# Patient Record
Sex: MALE | Employment: FULL TIME | URBAN - METROPOLITAN AREA
[De-identification: names, ages, dates, MRNs, and addresses within clinical notes are randomized per-mention and may not be internally consistent; named-entity substitution may affect disease eponyms.]

---

## 2018-03-06 ENCOUNTER — OFFICE VISIT (OUTPATIENT)
Dept: URGENT CARE | Facility: CLINIC | Age: 38
End: 2018-03-06
Payer: COMMERCIAL

## 2018-03-06 VITALS
DIASTOLIC BLOOD PRESSURE: 85 MMHG | HEART RATE: 104 BPM | OXYGEN SATURATION: 99 % | RESPIRATION RATE: 18 BRPM | TEMPERATURE: 100 F | HEIGHT: 68 IN | WEIGHT: 195 LBS | SYSTOLIC BLOOD PRESSURE: 134 MMHG | BODY MASS INDEX: 29.55 KG/M2

## 2018-03-06 DIAGNOSIS — R50.9 FEVER, UNSPECIFIED FEVER CAUSE: ICD-10-CM

## 2018-03-06 DIAGNOSIS — J10.1 INFLUENZA B: Primary | ICD-10-CM

## 2018-03-06 LAB
CTP QC/QA: YES
FLUAV AG NPH QL: NEGATIVE
FLUBV AG NPH QL: POSITIVE

## 2018-03-06 PROCEDURE — 87804 INFLUENZA ASSAY W/OPTIC: CPT | Mod: QW,S$GLB,, | Performed by: EMERGENCY MEDICINE

## 2018-03-06 PROCEDURE — 99203 OFFICE O/P NEW LOW 30 MIN: CPT | Mod: S$GLB,,, | Performed by: EMERGENCY MEDICINE

## 2018-03-06 RX ORDER — IBUPROFEN 200 MG
600 TABLET ORAL
Status: COMPLETED | OUTPATIENT
Start: 2018-03-06 | End: 2018-03-06

## 2018-03-06 RX ORDER — PROMETHAZINE HYDROCHLORIDE AND CODEINE PHOSPHATE 6.25; 1 MG/5ML; MG/5ML
SOLUTION ORAL
Qty: 118 ML | Refills: 0 | Status: SHIPPED | OUTPATIENT
Start: 2018-03-06 | End: 2020-09-04

## 2018-03-06 RX ADMIN — Medication 600 MG: at 10:03

## 2018-03-06 NOTE — LETTER
March 6, 2018      [facility] [address] [place]  [facility] [address]  Phone: [phone]  Fax: [phone]       Patient: Ryan Stephens   YOB: 1980  Date of Visit: 03/06/2018    To Whom It May Concern:    Wendy Stephens  was at [facility] on 03/06/2018. He may return to work/school on 3/8/18 with no restrictions. If you have any questions or concerns, or if I can be of further assistance, please do not hesitate to contact me.    Sincerely,      Rolando Carrillo MD

## 2018-03-06 NOTE — PROGRESS NOTES
Subjective:       Patient ID: Ryan Stephens Jr. is a 38 y.o. male.    Vitals:    03/06/18 0948   BP: 134/85   Pulse: 104   Resp: 18   Temp: 100.1 °F (37.8 °C)       Chief Complaint: Fever (3 days )    PATIENT STATES HE HAS PRETTY MILD SYMPTOMS OF COUGH. HE REPORTS HE STILL HAS APPETITE AND HAS BEEN EATING, HAS ENERGY, AND THE REASON HE IS HERE IS BECAUSE HIS FEVER KEEPS COMING BACK UP   SINCE Friday NIGHT WELL OVER 48 HOURS, ACTUALLY WELL OVER 72 HOURS AFTER SYMPTOMS ONSET TO NOW. HE DOES REPORT HEADACHE, AND MILD BODTY ACHES, WHEN THE FEVER IS PRESENT, AND IT VÁSQUEZ IMPROVE WITH THE ADVIL. HAS A 1 YO AT HOME AND MOTHER HELPING WITH .      Fever    This is a new problem. The current episode started in the past 7 days. The problem occurs constantly. The problem has been gradually worsening. The maximum temperature noted was 100 to 100.9 F. The temperature was taken using an oral thermometer. Associated symptoms include congestion, coughing, headaches and muscle aches. Pertinent negatives include no abdominal pain, chest pain, ear pain, nausea, sore throat or wheezing. He has tried acetaminophen for the symptoms. The treatment provided mild relief.     Review of Systems   Constitution: Positive for chills and fever. Negative for malaise/fatigue.   HENT: Positive for congestion. Negative for ear pain, hoarse voice and sore throat.    Eyes: Negative for discharge and redness.   Cardiovascular: Negative for chest pain, dyspnea on exertion and leg swelling.   Respiratory: Positive for cough. Negative for shortness of breath, sputum production and wheezing.    Musculoskeletal: Negative for myalgias.   Gastrointestinal: Negative for abdominal pain and nausea.   Neurological: Positive for headaches.       Objective:      Physical Exam   Constitutional: He is oriented to person, place, and time. He appears well-developed and well-nourished.   HENT:   Head: Normocephalic and atraumatic.   Right Ear:  External ear normal.   Left Ear: External ear normal.   Mouth/Throat: Mucous membranes are normal.   NASAL CONGESTION   Eyes: Conjunctivae, EOM and lids are normal.   Neck: Trachea normal, normal range of motion and full passive range of motion without pain. Neck supple.   Cardiovascular: Regular rhythm and normal heart sounds.    FEBRILE TACHY-MILD   Pulmonary/Chest: Effort normal and breath sounds normal. No respiratory distress.   DRY COUGH   Abdominal: Soft. Normal appearance and bowel sounds are normal. He exhibits no abdominal bruit, no pulsatile midline mass and no mass.   Musculoskeletal: Normal range of motion. He exhibits no edema.   Neurological: He is alert and oriented to person, place, and time. He has normal strength.   Skin: Skin is warm, dry and intact. He is not diaphoretic. No pallor.   Psychiatric: He has a normal mood and affect. His speech is normal and behavior is normal. Cognition and memory are normal.   Nursing note and vitals reviewed.      Office Visit on 03/06/2018   Component Date Value Ref Range Status    Rapid Influenza A Ag 03/06/2018 Negative  Negative Final    Rapid Influenza B Ag 03/06/2018 Positive* Negative Final     Acceptable 03/06/2018 Yes   Final       Assessment:       1. Influenza B    2. Fever, unspecified fever cause        Plan:       Ryan was seen today for fever.    Diagnoses and all orders for this visit:    Influenza B    Fever, unspecified fever cause  -     POCT Influenza A/B    Other orders  -     ibuprofen tablet 600 mg; Take 3 tablets (600 mg total) by mouth one time.          Patient Instructions   FLU B POSITIVE  REST AND HYDRATE WITH PLENTY OF FLUIDS  TYLENOL 650 MG EVERY 4-6 HOURS FOR FEVER/ACHES  MOTRIN 600 MG EVERY 6-8 HOURS FOR FEVER/ACHES  TAMIFLU RX  CHERATUSSIN AC RX FOR SEVERE COUGH OR COUGH AT NIGHT  OVER THE COUNTER ZYRTEC D OR CLARITIN D OR ALLEGRA D FOR CONGESTION AND RUNNY NOSE  OVER THE COUNTER FLONASE NASAL SPRAY FOR SINUS  CONGESTION  OVER THE COUNTER MUCINEX DM TWICE DAILY FOR COUGH    RETURN FOR ANY CONCERNS OR PROBLEMS  SEE FLU SHEET      Influenza (Adult)    Influenza is also called the flu. It is a viral illness that affects the air passages of your lungs. It is different from the common cold. The flu can easily be passed from one to person to another. It may be spread through the air by coughing and sneezing. Or it can be spread by touching the sick person and then touching your own eyes, nose, or mouth.  The flu starts 1 to 3 days after you are exposed to the flu virus. It may last for 1 to 2 weeks but many people feel tired or fatigued for many weeks afterward. You usually dont need to take antibiotics unless you have a complication. This might be an ear or sinus infection or pneumonia.  Symptoms of the flu may be mild or severe. They can include extreme tiredness (wanting to stay in bed all day), chills, fevers, muscle aches, soreness with eye movement, headache, and a dry, hacking cough.  Home care  Follow these guidelines when caring for yourself at home:  · Avoid being around cigarette smoke, whether yours or other peoples.  · Acetaminophen or ibuprofen will help ease your fever, muscle aches, and headache. Dont give aspirin to anyone younger than 18 who has the flu. Aspirin can harm the liver.  · Nausea and loss of appetite are common with the flu. Eat light meals. Drink 6 to 8 glasses of liquids every day. Good choices are water, sport drinks, soft drinks without caffeine, juices, tea, and soup. Extra fluids will also help loosen secretions in your nose and lungs.  · Over-the-counter cold medicines will not make the flu go away faster. But the medicines may help with coughing, sore throat, and congestion in your nose and sinuses. Dont use a decongestant if you have high blood pressure.  · Stay home until your fever has been gone for at least 24 hours without using medicine to reduce fever.  Follow-up care  Follow up  with your healthcare provider, or as advised, if you are not getting better over the next week.  If you are age 65 or older, talk with your provider about getting a pneumococcal vaccine every 5 years. You should also get this vaccine if you have chronic asthma or COPD. All adults should get a flu vaccine every fall. Ask your provider about this.  When to seek medical advice  Call your healthcare provider right away if any of these occur:  · Cough with lots of colored mucus (sputum) or blood in your mucus  · Chest pain, shortness of breath, wheezing, or trouble breathing  · Severe headache, or face, neck, or ear pain  · New rash with fever  · Fever of 100.4°F (38°C) or higher, or as directed by your healthcare provider  · Confusion, behavior change, or seizure  · Severe weakness or dizziness  · You get a new fever or cough after getting better for a few days  Date Last Reviewed: 1/1/2017  © 1160-0620 [employer]. [address] [place] [address], [place] [address]. All rights reserved. This information is not intended as a substitute for professional medical care. Always follow your healthcare professional's instructions.

## 2018-03-06 NOTE — PATIENT INSTRUCTIONS
FLU B POSITIVE  REST AND HYDRATE WITH PLENTY OF FLUIDS  TYLENOL 650 MG EVERY 4-6 HOURS FOR FEVER/ACHES  MOTRIN 600 MG EVERY 6-8 HOURS FOR FEVER/ACHES  NO TAMIFLU AS DISCUSSED. OVER 48, ACTUALLY OVER 72-96 HOURS AFTER SYMPTOM ONSET AND MILD SYMPTOMS  PROMETHAZINE WITH CODEINE RX FOR SEVERE COUGH OR COUGH AT NIGHT  OVER THE COUNTER ZYRTEC D OR CLARITIN D OR ALLEGRA D FOR CONGESTION AND RUNNY NOSE  OVER THE COUNTER FLONASE NASAL SPRAY FOR SINUS CONGESTION  OVER THE COUNTER MUCINEX DM TWICE DAILY FOR COUGH    RETURN FOR ANY CONCERNS OR PROBLEMS  SEE FLU SHEET      Influenza (Adult)    Influenza is also called the flu. It is a viral illness that affects the air passages of your lungs. It is different from the common cold. The flu can easily be passed from one to person to another. It may be spread through the air by coughing and sneezing. Or it can be spread by touching the sick person and then touching your own eyes, nose, or mouth.  The flu starts 1 to 3 days after you are exposed to the flu virus. It may last for 1 to 2 weeks but many people feel tired or fatigued for many weeks afterward. You usually dont need to take antibiotics unless you have a complication. This might be an ear or sinus infection or pneumonia.  Symptoms of the flu may be mild or severe. They can include extreme tiredness (wanting to stay in bed all day), chills, fevers, muscle aches, soreness with eye movement, headache, and a dry, hacking cough.  Home care  Follow these guidelines when caring for yourself at home:  · Avoid being around cigarette smoke, whether yours or other peoples.  · Acetaminophen or ibuprofen will help ease your fever, muscle aches, and headache. Dont give aspirin to anyone younger than 18 who has the flu. Aspirin can harm the liver.  · Nausea and loss of appetite are common with the flu. Eat light meals. Drink 6 to 8 glasses of liquids every day. Good choices are water, sport drinks, soft drinks without caffeine, juices,  tea, and soup. Extra fluids will also help loosen secretions in your nose and lungs.  · Over-the-counter cold medicines will not make the flu go away faster. But the medicines may help with coughing, sore throat, and congestion in your nose and sinuses. Dont use a decongestant if you have high blood pressure.  · Stay home until your fever has been gone for at least 24 hours without using medicine to reduce fever.  Follow-up care  Follow up with your healthcare provider, or as advised, if you are not getting better over the next week.  If you are age 65 or older, talk with your provider about getting a pneumococcal vaccine every 5 years. You should also get this vaccine if you have chronic asthma or COPD. All adults should get a flu vaccine every fall. Ask your provider about this.  When to seek medical advice  Call your healthcare provider right away if any of these occur:  · Cough with lots of colored mucus (sputum) or blood in your mucus  · Chest pain, shortness of breath, wheezing, or trouble breathing  · Severe headache, or face, neck, or ear pain  · New rash with fever  · Fever of 100.4°F (38°C) or higher, or as directed by your healthcare provider  · Confusion, behavior change, or seizure  · Severe weakness or dizziness  · You get a new fever or cough after getting better for a few days  Date Last Reviewed: 1/1/2017  © 7631-5708 [employer]. [address] [place] [address], [place] [address]. All rights reserved. This information is not intended as a substitute for professional medical care. Always follow your healthcare professional's instructions.

## 2019-12-30 ENCOUNTER — TELEPHONE (OUTPATIENT)
Dept: INTERNAL MEDICINE | Facility: CLINIC | Age: 39
End: 2019-12-30

## 2020-04-01 ENCOUNTER — OFFICE VISIT (OUTPATIENT)
Dept: FAMILY MEDICINE | Facility: CLINIC | Age: 40
End: 2020-04-01
Payer: COMMERCIAL

## 2020-04-01 DIAGNOSIS — K52.9 CHRONIC DIARRHEA: Primary | ICD-10-CM

## 2020-04-01 DIAGNOSIS — E55.9 VITAMIN D DEFICIENCY: ICD-10-CM

## 2020-04-01 DIAGNOSIS — Z00.00 ANNUAL PHYSICAL EXAM: ICD-10-CM

## 2020-04-01 DIAGNOSIS — R42 VERTIGO: ICD-10-CM

## 2020-04-01 PROCEDURE — 99204 PR OFFICE/OUTPT VISIT, NEW, LEVL IV, 45-59 MIN: ICD-10-PCS | Mod: 95,,, | Performed by: FAMILY MEDICINE

## 2020-04-01 PROCEDURE — 99204 OFFICE O/P NEW MOD 45 MIN: CPT | Mod: 95,,, | Performed by: FAMILY MEDICINE

## 2020-04-01 RX ORDER — FLUTICASONE PROPIONATE 50 MCG
1 SPRAY, SUSPENSION (ML) NASAL DAILY
Qty: 16 G | Refills: 5 | Status: SHIPPED | OUTPATIENT
Start: 2020-04-01 | End: 2020-09-04

## 2020-04-01 RX ORDER — MECLIZINE HYDROCHLORIDE 25 MG/1
25 TABLET ORAL 3 TIMES DAILY PRN
Qty: 30 TABLET | Refills: 5 | Status: SHIPPED | OUTPATIENT
Start: 2020-04-01 | End: 2020-09-04

## 2020-04-01 NOTE — PROGRESS NOTES
Subjective:       Patient ID: Ryan Stephens Jr. is a 40 y.o. male.    Chief Complaint: No chief complaint on file.    The patient location is: [place]  The chief complaint leading to consultation is: dizziness, stomach issues, and I need a PCP  Visit type: Virtual visit with synchronous audio and video  Total time spent with patient: 30 MINUTES  Each patient to whom he or she provides medical services by telemedicine is:  (1) informed of the relationship between the physician and patient and the respective role of any other health care provider with respect to management of the patient; and (2) notified that he or she may decline to receive medical services by telemedicine and may withdraw from such care at any time.    Notes:       40 year old male presents with stomach issues and dizziness. He has gas and stomach pains. He has more issues with this when he eats late at night. He has been treating this with green smoothies an probiotics. He has diarrhea with this as well. He ate [employer] chicken and ribs. He ate corn grits and cabbage. He avoids dairy to minimize exposure.     Dizziness:   Chronicity:  Recurrent (dizziness with change in posisitons or with turning from side to side. )  Onset:  1 to 4 weeks ago (3 weeks)  Dizziness characteristics:  Sensation of movement   Associated symptoms: ear congestion.no hearing loss, no ear pain, no fever, no headaches, no tinnitus, no vomiting, no weakness, no palpitations and no chest pain.    Review of Systems   Constitutional: Negative for activity change, fever and unexpected weight change.   HENT: Negative for congestion, ear pain, hearing loss, postnasal drip, rhinorrhea, sinus pressure, tinnitus and trouble swallowing.    Eyes: Negative for discharge and visual disturbance.   Respiratory: Negative for chest tightness and wheezing.    Cardiovascular: Negative for chest pain and palpitations.   Gastrointestinal: Negative for blood in stool, constipation, diarrhea  and vomiting.   Endocrine: Negative for polydipsia and polyuria.   Genitourinary: Negative for difficulty urinating, hematuria and urgency.   Musculoskeletal: Negative for arthralgias, joint swelling and neck pain.   Neurological: Positive for dizziness. Negative for weakness and headaches.   Psychiatric/Behavioral: Negative for confusion and dysphoric mood.       Objective:      Physical Exam    Assessment:       1. Chronic diarrhea    2. Vertigo    3. Annual physical exam    4. Vitamin D deficiency        Plan:       Diagnoses and all orders for this visit:    Chronic diarrhea  -     Tissue transglutaminase, IgA; Future  -     IgA; Future  -     ALLERGEN SOYBEAN; Future  -     ALLERGEN MILK; Future  WILL CHECK FOR CELIAC AND FOOD ALLERGIES. WILL ADD MORE AS HE GIVES ME HIS FOOD DIARY    Vertigo  -     fluticasone propionate (FLONASE) 50 mcg/actuation nasal spray; 1 spray (50 mcg total) by Each Nostril route once daily.  -     meclizine (ANTIVERT) 25 mg tablet; Take 1 tablet (25 mg total) by mouth 3 (three) times daily as needed for Dizziness.  MECLIZINE 1/2 TO 1 TABLET UP TO 3 TIMES A DAY    Annual physical exam  -     CBC auto differential; Future  -     Comprehensive metabolic panel; Future  -     Lipid panel; Future  -     TSH; Future  DUE FOR LABS    Vitamin D deficiency  -     Vitamin D; Future

## 2020-04-02 ENCOUNTER — PATIENT MESSAGE (OUTPATIENT)
Dept: FAMILY MEDICINE | Facility: CLINIC | Age: 40
End: 2020-04-02

## 2020-04-14 ENCOUNTER — TELEPHONE (OUTPATIENT)
Dept: OTOLARYNGOLOGY | Facility: CLINIC | Age: 40
End: 2020-04-14

## 2020-04-14 ENCOUNTER — PATIENT MESSAGE (OUTPATIENT)
Dept: OTOLARYNGOLOGY | Facility: CLINIC | Age: 40
End: 2020-04-14

## 2020-04-14 NOTE — TELEPHONE ENCOUNTER
Left message for patient to call the office regarding his appointment on Monday April 20,2020 due to Covid-19 we are not seeing patients in the office.

## 2020-04-14 NOTE — TELEPHONE ENCOUNTER
Left voicemail for patient to call the office regarding his appointment on Monday April 20, 2020 due to Covid-19 we are not seeing patients in the office. I can schedule him a virtual visit. Also emailed patient through the portal.

## 2020-04-14 NOTE — TELEPHONE ENCOUNTER
Left message for patient to call the office and schedule a virtual visit due to Covid-19 we are not seeing patients in the office.

## 2020-04-14 NOTE — TELEPHONE ENCOUNTER
Left message for patient to call the office regarding his appointment on 04/20/2020 due to Covid-19 we are not seeing patients in the office. We can do a virtual visit.

## 2020-04-15 ENCOUNTER — TELEPHONE (OUTPATIENT)
Dept: OTOLARYNGOLOGY | Facility: CLINIC | Age: 40
End: 2020-04-15

## 2020-04-15 NOTE — TELEPHONE ENCOUNTER
Spoke with patients mother  explained to her due to Covid-19 we are not seeing patients in the office. I told her we are offering virtual visits and gave her the number to the clinic to have her son call back. Mrs. Stephens stated she would relay the message to her son.  voice understanding.

## 2020-05-05 ENCOUNTER — PATIENT MESSAGE (OUTPATIENT)
Dept: FAMILY MEDICINE | Facility: CLINIC | Age: 40
End: 2020-05-05

## 2020-05-08 ENCOUNTER — LAB VISIT (OUTPATIENT)
Dept: LAB | Facility: HOSPITAL | Age: 40
End: 2020-05-08
Attending: FAMILY MEDICINE
Payer: COMMERCIAL

## 2020-05-08 DIAGNOSIS — Z00.00 ANNUAL PHYSICAL EXAM: ICD-10-CM

## 2020-05-08 DIAGNOSIS — E55.9 VITAMIN D DEFICIENCY: ICD-10-CM

## 2020-05-08 DIAGNOSIS — K52.9 CHRONIC DIARRHEA: ICD-10-CM

## 2020-05-08 LAB
25(OH)D3+25(OH)D2 SERPL-MCNC: 44 NG/ML (ref 30–96)
ALBUMIN SERPL BCP-MCNC: 4 G/DL (ref 3.5–5.2)
ALP SERPL-CCNC: 78 U/L (ref 55–135)
ALT SERPL W/O P-5'-P-CCNC: 27 U/L (ref 10–44)
ANION GAP SERPL CALC-SCNC: 10 MMOL/L (ref 8–16)
AST SERPL-CCNC: 24 U/L (ref 10–40)
BASOPHILS # BLD AUTO: 0.03 K/UL (ref 0–0.2)
BASOPHILS NFR BLD: 0.5 % (ref 0–1.9)
BILIRUB SERPL-MCNC: 0.3 MG/DL (ref 0.1–1)
BUN SERPL-MCNC: 11 MG/DL (ref 6–20)
CALCIUM SERPL-MCNC: 9.3 MG/DL (ref 8.7–10.5)
CHLORIDE SERPL-SCNC: 104 MMOL/L (ref 95–110)
CHOLEST SERPL-MCNC: 228 MG/DL (ref 120–199)
CHOLEST/HDLC SERPL: 5.6 {RATIO} (ref 2–5)
CO2 SERPL-SCNC: 26 MMOL/L (ref 23–29)
CREAT SERPL-MCNC: 0.9 MG/DL (ref 0.5–1.4)
DIFFERENTIAL METHOD: ABNORMAL
EOSINOPHIL # BLD AUTO: 0.2 K/UL (ref 0–0.5)
EOSINOPHIL NFR BLD: 3.9 % (ref 0–8)
ERYTHROCYTE [DISTWIDTH] IN BLOOD BY AUTOMATED COUNT: 13.6 % (ref 11.5–14.5)
EST. GFR  (AFRICAN AMERICAN): >60 ML/MIN/1.73 M^2
EST. GFR  (NON AFRICAN AMERICAN): >60 ML/MIN/1.73 M^2
GLUCOSE SERPL-MCNC: 99 MG/DL (ref 70–110)
HCT VFR BLD AUTO: 43.7 % (ref 40–54)
HDLC SERPL-MCNC: 41 MG/DL (ref 40–75)
HDLC SERPL: 18 % (ref 20–50)
HGB BLD-MCNC: 13.4 G/DL (ref 14–18)
IGA SERPL-MCNC: 482 MG/DL (ref 40–350)
IMM GRANULOCYTES # BLD AUTO: 0.01 K/UL (ref 0–0.04)
IMM GRANULOCYTES NFR BLD AUTO: 0.2 % (ref 0–0.5)
LDLC SERPL CALC-MCNC: 152.4 MG/DL (ref 63–159)
LYMPHOCYTES # BLD AUTO: 3.1 K/UL (ref 1–4.8)
LYMPHOCYTES NFR BLD: 50.8 % (ref 18–48)
MCH RBC QN AUTO: 25.8 PG (ref 27–31)
MCHC RBC AUTO-ENTMCNC: 30.7 G/DL (ref 32–36)
MCV RBC AUTO: 84 FL (ref 82–98)
MONOCYTES # BLD AUTO: 0.5 K/UL (ref 0.3–1)
MONOCYTES NFR BLD: 8.2 % (ref 4–15)
NEUTROPHILS # BLD AUTO: 2.2 K/UL (ref 1.8–7.7)
NEUTROPHILS NFR BLD: 36.4 % (ref 38–73)
NONHDLC SERPL-MCNC: 187 MG/DL
NRBC BLD-RTO: 0 /100 WBC
PLATELET # BLD AUTO: 271 K/UL (ref 150–350)
PMV BLD AUTO: 11.3 FL (ref 9.2–12.9)
POTASSIUM SERPL-SCNC: 4 MMOL/L (ref 3.5–5.1)
PROT SERPL-MCNC: 8 G/DL (ref 6–8.4)
RBC # BLD AUTO: 5.19 M/UL (ref 4.6–6.2)
SODIUM SERPL-SCNC: 140 MMOL/L (ref 136–145)
TRIGL SERPL-MCNC: 173 MG/DL (ref 30–150)
TSH SERPL DL<=0.005 MIU/L-ACNC: 1.99 UIU/ML (ref 0.4–4)
WBC # BLD AUTO: 6.12 K/UL (ref 3.9–12.7)

## 2020-05-08 PROCEDURE — 80053 COMPREHEN METABOLIC PANEL: CPT

## 2020-05-08 PROCEDURE — 80061 LIPID PANEL: CPT

## 2020-05-08 PROCEDURE — 82306 VITAMIN D 25 HYDROXY: CPT

## 2020-05-08 PROCEDURE — 84443 ASSAY THYROID STIM HORMONE: CPT

## 2020-05-08 PROCEDURE — 82784 ASSAY IGA/IGD/IGG/IGM EACH: CPT

## 2020-05-08 PROCEDURE — 83516 IMMUNOASSAY NONANTIBODY: CPT

## 2020-05-08 PROCEDURE — 85025 COMPLETE CBC W/AUTO DIFF WBC: CPT

## 2020-05-08 PROCEDURE — 86003 ALLG SPEC IGE CRUDE XTRC EA: CPT

## 2020-05-08 PROCEDURE — 86003 ALLG SPEC IGE CRUDE XTRC EA: CPT | Mod: 59

## 2020-05-08 PROCEDURE — 36415 COLL VENOUS BLD VENIPUNCTURE: CPT | Mod: PO

## 2020-05-09 ENCOUNTER — PATIENT MESSAGE (OUTPATIENT)
Dept: FAMILY MEDICINE | Facility: CLINIC | Age: 40
End: 2020-05-09

## 2020-05-11 LAB
COW MILK IGE QN: <0.1 KU/L
DEPRECATED COW MILK IGE RAST QL: NORMAL
DEPRECATED SOYBEAN IGE RAST QL: NORMAL
SOYBEAN IGE QN: <0.1 KU/L
TTG IGA SER-ACNC: 7 UNITS

## 2020-05-12 ENCOUNTER — PATIENT MESSAGE (OUTPATIENT)
Dept: FAMILY MEDICINE | Facility: CLINIC | Age: 40
End: 2020-05-12

## 2020-09-02 ENCOUNTER — HOSPITAL ENCOUNTER (EMERGENCY)
Facility: HOSPITAL | Age: 40
Discharge: HOME | End: 2020-09-02
Attending: EMERGENCY MEDICINE
Payer: COMMERCIAL

## 2020-09-02 VITALS
HEIGHT: 68 IN | OXYGEN SATURATION: 99 % | DIASTOLIC BLOOD PRESSURE: 94 MMHG | TEMPERATURE: 98 F | WEIGHT: 195 LBS | HEART RATE: 101 BPM | RESPIRATION RATE: 16 BRPM | BODY MASS INDEX: 29.55 KG/M2 | SYSTOLIC BLOOD PRESSURE: 162 MMHG

## 2020-09-02 DIAGNOSIS — T78.40XA ALLERGIC REACTION, INITIAL ENCOUNTER: Primary | ICD-10-CM

## 2020-09-02 PROCEDURE — 25000003 PHARM REV CODE 250: Performed by: PHYSICIAN ASSISTANT

## 2020-09-02 PROCEDURE — 63600175 PHARM REV CODE 636 W HCPCS: Performed by: PHYSICIAN ASSISTANT

## 2020-09-02 PROCEDURE — 96372 THER/PROPH/DIAG INJ SC/IM: CPT

## 2020-09-02 PROCEDURE — 99284 EMERGENCY DEPT VISIT MOD MDM: CPT | Mod: ,,, | Performed by: PHYSICIAN ASSISTANT

## 2020-09-02 PROCEDURE — 99284 EMERGENCY DEPT VISIT MOD MDM: CPT | Mod: 25

## 2020-09-02 PROCEDURE — 99284 PR EMERGENCY DEPT VISIT,LEVEL IV: ICD-10-PCS | Mod: ,,, | Performed by: PHYSICIAN ASSISTANT

## 2020-09-02 RX ORDER — EPINEPHRINE 0.3 MG/.3ML
1 INJECTION SUBCUTANEOUS
Qty: 1 DEVICE | Refills: 0 | Status: SHIPPED | OUTPATIENT
Start: 2020-09-02 | End: 2021-09-02

## 2020-09-02 RX ORDER — DIPHENHYDRAMINE HYDROCHLORIDE 50 MG/ML
50 INJECTION INTRAMUSCULAR; INTRAVENOUS
Status: COMPLETED | OUTPATIENT
Start: 2020-09-02 | End: 2020-09-02

## 2020-09-02 RX ORDER — FAMOTIDINE 20 MG/1
20 TABLET, FILM COATED ORAL
Status: COMPLETED | OUTPATIENT
Start: 2020-09-02 | End: 2020-09-02

## 2020-09-02 RX ORDER — METHYLPREDNISOLONE 4 MG/1
TABLET ORAL
Qty: 1 PACKAGE | Refills: 0 | Status: SHIPPED | OUTPATIENT
Start: 2020-09-02 | End: 2020-09-11 | Stop reason: SDUPTHER

## 2020-09-02 RX ORDER — FAMOTIDINE 20 MG/1
20 TABLET, FILM COATED ORAL 2 TIMES DAILY
Qty: 6 TABLET | Refills: 0 | Status: SHIPPED | OUTPATIENT
Start: 2020-09-02 | End: 2021-03-05

## 2020-09-02 RX ORDER — METHYLPREDNISOLONE SOD SUCC 125 MG
125 VIAL (EA) INJECTION
Status: COMPLETED | OUTPATIENT
Start: 2020-09-02 | End: 2020-09-02

## 2020-09-02 RX ADMIN — DIPHENHYDRAMINE HYDROCHLORIDE 50 MG: 50 INJECTION, SOLUTION INTRAMUSCULAR; INTRAVENOUS at 05:09

## 2020-09-02 RX ADMIN — METHYLPREDNISOLONE SODIUM SUCCINATE 125 MG: 125 INJECTION, POWDER, FOR SOLUTION INTRAMUSCULAR; INTRAVENOUS at 05:09

## 2020-09-02 RX ADMIN — FAMOTIDINE 20 MG: 20 TABLET, FILM COATED ORAL at 05:09

## 2020-09-02 NOTE — ED PROVIDER NOTES
Encounter Date: 9/2/2020       History     Chief Complaint   Patient presents with    Rash     began around 2300 last night, on arms, chest, stomach, neck. no new soaps/detergents, denies itching but states feels hot. took 25 mg benadryl at 2300     [age] male presents to the ER for evaluation of her rash.  Rash began around 2pm yesterday.  Patient did take Benadryl 1 tablet around 2:00 p.m. and ending at around 11:00 p.m..  The rest itches.  The rash has been spreading.  The rash is present throughout the majority of the bodily surface but sparing the groin.  The patient denies any trouble breathing or swallowing.  He denies any throat tightness.  Unclear of the etiology.        Review of patient's allergies indicates:  No Known Allergies  No past medical history on file.  Past Surgical History:   Procedure Laterality Date    LASIK       Family History   Problem Relation Age of Onset    Hypertension Mother     Kidney cancer Mother     Cancer Maternal Grandmother         breast    Cancer Maternal Grandfather         lung    Cancer Paternal Grandfather     Heart disease Other     Aneurysm Father     Celiac disease Sister     Diabetes Neg Hx      Social History     Tobacco Use    Smoking status: Never Smoker   Substance Use Topics    Alcohol use: Yes     Frequency: Never     Drinks per session: Patient refused     Binge frequency: Never     Comment: socially    Drug use: No     Review of Systems   Constitutional: Negative for fever.   HENT: Negative for sore throat.    Respiratory: Negative for shortness of breath.    Cardiovascular: Negative for chest pain.   Gastrointestinal: Negative for nausea.   Genitourinary: Negative for dysuria.   Musculoskeletal: Negative for back pain.   Skin: Positive for rash.   Neurological: Negative for weakness.   Hematological: Does not bruise/bleed easily.       Physical Exam     Initial Vitals [09/02/20 0454]   BP Pulse Resp Temp SpO2   (!) 162/94 101 16 98 °F  (36.7 °C) 99 %      MAP       --         Physical Exam    Constitutional: Vital signs are normal. He appears well-developed and well-nourished. He is not diaphoretic. No distress.   HENT:   Head: Normocephalic and atraumatic.   Right Ear: Hearing normal.   Left Ear: Hearing normal.   Eyes: Conjunctivae are normal.   Cardiovascular: Normal rate and regular rhythm.   Abdominal: Soft. Normal appearance and bowel sounds are normal.   Musculoskeletal: Normal range of motion.   Neurological: He is alert and oriented to person, place, and time.   Skin: Skin is warm and intact.   Macular papular rash that blanches on exam throughout the majority of the bodily surface   Psychiatric: He has a normal mood and affect. His speech is normal and behavior is normal. Cognition and memory are normal.         ED Course   Procedures  Labs Reviewed - No data to display       Imaging Results    None          Medical Decision Making:   Initial Assessment:   40-year-old male presenting with a rash present for the past 12-16 hours  Differential Diagnosis:   Contact dermatitis, allergic reaction, anaphylaxis, hypersensitivity reaction  ED Management:  Plan:  No signs of anaphylaxis, patient nondistressed, no respiratory distress, vital signs stable and not hypoxic  I will treat with Benadryl, Solu-Medrol and Pepcid in the ED.  The patient will be discharged home with an EpiPen to be used in the event of anaphylaxis, patient education given.   He will also be sent home with a Medrol Dosepak and advised to continue using Benadryl and over-the-counter Pepcid for the next 3-5 days.   Return precautions and follow-up information given.                                 Clinical Impression:       ICD-10-CM ICD-9-CM   1. Allergic reaction, initial encounter  T78.40XA 995.3         Disposition:   Disposition: Discharged  Condition: Stable     ED Disposition Condition    Discharge Stable        ED Prescriptions     Medication Sig Dispense Start Date  End Date Auth. Provider    EPINEPHrine (EPIPEN) 0.3 mg/0.3 mL AtIn Inject 0.3 mLs (0.3 mg total) into the muscle as needed. 1 Device 9/2/2020 9/2/2021 Aaron Camilo PA-C    methylPREDNISolone (MEDROL DOSEPACK) 4 mg tablet Take as directed 1 Package 9/2/2020 9/23/2020 Aaron Camilo PA-C        Follow-up Information    None                                    Aaron Camilo PA-C  09/02/20 0582

## 2020-09-02 NOTE — DISCHARGE INSTRUCTIONS
TakeAll steroids until complete  Use Benadryl 2 tablets every 6 hr as directed on package  Use EpiPen only in the event of trouble breathing or swallowing then call 911  Return to the ER for any new symptoms

## 2020-09-04 ENCOUNTER — OFFICE VISIT (OUTPATIENT)
Dept: FAMILY MEDICINE | Facility: CLINIC | Age: 40
End: 2020-09-04
Payer: COMMERCIAL

## 2020-09-04 VITALS
HEART RATE: 81 BPM | BODY MASS INDEX: 30.07 KG/M2 | HEIGHT: 68 IN | SYSTOLIC BLOOD PRESSURE: 120 MMHG | OXYGEN SATURATION: 98 % | RESPIRATION RATE: 20 BRPM | TEMPERATURE: 98 F | WEIGHT: 198.44 LBS | DIASTOLIC BLOOD PRESSURE: 86 MMHG

## 2020-09-04 DIAGNOSIS — L23.9 ALLERGIC CONTACT DERMATITIS, UNSPECIFIED TRIGGER: Primary | ICD-10-CM

## 2020-09-04 PROCEDURE — 99999 PR PBB SHADOW E&M-EST. PATIENT-LVL IV: CPT | Mod: PBBFAC,,, | Performed by: INTERNAL MEDICINE

## 2020-09-04 PROCEDURE — 3008F BODY MASS INDEX DOCD: CPT | Mod: CPTII,S$GLB,, | Performed by: INTERNAL MEDICINE

## 2020-09-04 PROCEDURE — 99214 OFFICE O/P EST MOD 30 MIN: CPT | Mod: S$GLB,,, | Performed by: INTERNAL MEDICINE

## 2020-09-04 PROCEDURE — 3008F PR BODY MASS INDEX (BMI) DOCUMENTED: ICD-10-PCS | Mod: CPTII,S$GLB,, | Performed by: INTERNAL MEDICINE

## 2020-09-04 PROCEDURE — 99214 PR OFFICE/OUTPT VISIT, EST, LEVL IV, 30-39 MIN: ICD-10-PCS | Mod: S$GLB,,, | Performed by: INTERNAL MEDICINE

## 2020-09-04 PROCEDURE — 99999 PR PBB SHADOW E&M-EST. PATIENT-LVL IV: ICD-10-PCS | Mod: PBBFAC,,, | Performed by: INTERNAL MEDICINE

## 2020-09-04 NOTE — PROGRESS NOTES
Subjective:       Patient ID: Ryan Stephens Jr. is a pleasant 40 y.o. Patient Refused male patient    Chief Complaint: Rash (since 9/1/2020 )      Patient is a new pt to me but established pt from Dr. Goldstein, last seen by her in 04/2020.    HPI    He comes today for follow-up of a skin rash.   He went to the ED on the 2nd of September for allergic reaction, see their notes.  No signs of anaphylaxis.  Was given antihistamine, im prednisolone as well as famotidine.  Also given a Medrol Dosepak.  He reports that he had no more skin lesions afterwards.  However he states that he had BBQ pulled pork yesterday during daytime, and at night some new skin reaction. He sent some pictures through the Portal.  It is not present today anymore, and he never had problem with breathing or issues to swallow.  Except the fact that he ate pork yesterday as above, he has had no change in his diet, no new detergent or shower gel, he has no contact with any pets. No new medications except the Medrol pack, Benadryl and famotidine.  He has been taking turmeric supplement for months with no issues.  He never had asthma or skin reactions when he was a child.  He was tested for possible celiac disease by Dr. Goldstein in April 2 due to diarrhea.  Only abnormal test was IgA at 482.  He has no more diarrhea.  He has no other symptoms today.    Patient Active Problem List   Diagnosis    Allergic contact dermatitis          ACTIVE MEDICAL ISSUES:  Documented in Problem List     PAST MEDICAL HISTORY  Documented     PAST SURGICAL HISTORY:  Documented     SOCIAL HISTORY:  Documented     FAMILY HISTORY:  Documented     ALLERGIES AND MEDICATIONS: updated and reviewed.  Documented    Review of Systems   HENT: Negative.    Respiratory: Negative.    Cardiovascular: Negative.    Gastrointestinal: Negative.    Skin: Positive for rash.       Objective:      Physical Exam  Vitals signs and nursing note reviewed.   Constitutional:       Appearance:  "Normal appearance. He is obese.   HENT:      Right Ear: Tympanic membrane normal.      Left Ear: Tympanic membrane normal.      Mouth/Throat:      Mouth: Mucous membranes are moist.      Pharynx: Oropharynx is clear.   Eyes:      Extraocular Movements: Extraocular movements intact.      Conjunctiva/sclera: Conjunctivae normal.      Pupils: Pupils are equal, round, and reactive to light.   Cardiovascular:      Rate and Rhythm: Normal rate and regular rhythm.      Pulses: Normal pulses.      Heart sounds: Normal heart sounds.   Pulmonary:      Effort: Pulmonary effort is normal.      Breath sounds: Normal breath sounds.   Skin:     General: Skin is warm and dry.      Findings: Rash (one very small area of rash on the internal part of L elbow) present.   Neurological:      Mental Status: He is alert.         Vitals:    09/04/20 1258   BP: 120/86   BP Location: Right arm   Patient Position: Sitting   BP Method: Small (Manual)   Pulse: 81   Resp: 20   Temp: 98.1 °F (36.7 °C)   TempSrc: Oral   SpO2: 98%   Weight: 90 kg (198 lb 6.6 oz)   Height: 5' 8" (1.727 m)     Body mass index is 30.17 kg/m².    RESULTS: Reviewed labs from last 12 months    Assessment:       1. Allergic contact dermatitis, unspecified trigger        Plan:   Ryan was seen today for rash.    Diagnoses and all orders for this visit:    Allergic contact dermatitis, unspecified trigger  -     Ambulatory referral/consult to Allergy; Future    See HPI and PE.  Impossible to find the culprit.  Advised patient to the finish the course of steroid.  He will also go on taking Benadryl and famotidine for now.  He has an EpiPen.  He is aware that he needs to seek for medical attention if worsening issue.  I suggested for him to keep a food journal.  I will refer him to Allergology.  He is also going to have a virtual visit with Dr. Goldstein on the 11th.  He would like to be reassessed before leaving for a trip on the 17th.    I have spent 25 minutes in the care of " this patient with >50% in counseling and coordination of care regarding the need to do a log of the foods and drinks he is having, and to pinpoint any thing that may cause above.  Review her to take medications and the need to have the EpiPen available..  Follow up for already scheduled with Dr. Goldstein.    This note was created by combination of typed  and M-Modal dictation.  Transcription errors may be present.  If there are any questions, please contact me.

## 2020-09-11 ENCOUNTER — OFFICE VISIT (OUTPATIENT)
Dept: FAMILY MEDICINE | Facility: CLINIC | Age: 40
End: 2020-09-11
Payer: COMMERCIAL

## 2020-09-11 DIAGNOSIS — Z91.018 FOOD ALLERGY: Primary | ICD-10-CM

## 2020-09-11 PROCEDURE — 99213 PR OFFICE/OUTPT VISIT, EST, LEVL III, 20-29 MIN: ICD-10-PCS | Mod: 95,,, | Performed by: FAMILY MEDICINE

## 2020-09-11 PROCEDURE — 99213 OFFICE O/P EST LOW 20 MIN: CPT | Mod: 95,,, | Performed by: FAMILY MEDICINE

## 2020-09-11 RX ORDER — METHYLPREDNISOLONE 4 MG/1
TABLET ORAL
Qty: 1 PACKAGE | Refills: 0 | Status: SHIPPED | OUTPATIENT
Start: 2020-09-11 | End: 2020-10-02

## 2020-09-11 NOTE — PROGRESS NOTES
Subjective:       Patient ID: Ryan Stephens Jr. is a 40 y.o. male.    Chief Complaint: No chief complaint on file.    The patient location is: [place]  The chief complaint leading to consultation is: rash after eating    Visit type: audiovisual    Face to Face time with patient:   12 minutes of total time spent on the encounter, which includes face to face time and non-face to face time preparing to see the patient (eg, review of tests), Obtaining and/or reviewing separately obtained history, Documenting clinical information in the electronic or other health record, Independently interpreting results (not separately reported) and communicating results to the patient/family/caregiver, or Care coordination (not separately reported).         Each patient to whom he or she provides medical services by telemedicine is:  (1) informed of the relationship between the physician and patient and the respective role of any other health care provider with respect to management of the patient; and (2) notified that he or she may decline to receive medical services by telemedicine and may withdraw from such care at any time.    Notes:   She states she ate breakfast and had a tingling sensation in her mouth and had a rash everywhere. She had chick adal breakfast.     Rash  This is a new problem. The current episode started 1 to 4 weeks ago. The problem has been resolved since onset. The affected locations include the face, neck, torso, back, abdomen, left shoulder, left axilla, left arm, left hip, left buttock, right shoulder, right axilla, right arm, right hip and right buttock. The rash is characterized by redness and swelling. It is unknown if there was an exposure to a precipitant. Associated symptoms include fatigue. Pertinent negatives include no anorexia, congestion, cough, diarrhea, eye pain, facial edema, fever, joint pain, nail changes, rhinorrhea, shortness of breath, sore throat or vomiting. Past treatments  include antibiotics. The treatment provided moderate relief. There is no history of allergies, asthma, eczema or varicella.     Review of Systems   Constitutional: Positive for fatigue. Negative for fever.   HENT: Negative for nasal congestion, rhinorrhea and sore throat.    Eyes: Negative for pain.   Respiratory: Negative for cough and shortness of breath.    Gastrointestinal: Negative for anorexia, diarrhea and vomiting.   Musculoskeletal: Negative for joint pain.   Integumentary:  Positive for rash. Negative for nail changes.         Objective:      Physical Exam    Assessment:       1. Food allergy        Plan:       Diagnoses and all orders for this visit:    Food allergy  -     IGE; Future  -     ALLERGEN CHICKEN; Future  -     ALLERGEN WHEAT; Future  -     ALLERGEN EGG YOLK; Future  -     ALLERGEN PORK IGE; Future  -     ALLERGEN MILK; Future  -     ALLERGEN SOYBEAN; Future  -     ALLERGEN-TURKEY; Future  -     ALLERGEN COFFEE IGE; Future  -     methylPREDNISolone (MEDROL DOSEPACK) 4 mg tablet; Take as directed

## 2020-09-11 NOTE — PROGRESS NOTES
Answers for HPI/ROS submitted by the patient on 9/11/2020   Rash  Chronicity: new  Onset: 1 to 4 weeks ago  Progression since onset: resolved  Affected locations: face, neck, torso, back, abdomen, left shoulder, left axilla, left arm, left hip, left buttock, right shoulder, right axilla, right arm, right hip, right buttock  Characteristics: redness, swelling  Exposed to: unknown  anorexia: No  congestion: No  cough: No  diarrhea: No  eye pain: No  facial edema: No  fatigue: Yes  fever: No  joint pain: No  nail changes: No  rhinorrhea: No  shortness of breath: No  sore throat: No  vomiting: No  Treatments tried: antibiotics  Improvement on treatment: moderate  asthma: No  allergies: No  eczema: No  varicella: No

## 2020-09-14 ENCOUNTER — LAB VISIT (OUTPATIENT)
Dept: LAB | Facility: HOSPITAL | Age: 40
End: 2020-09-14
Attending: FAMILY MEDICINE
Payer: COMMERCIAL

## 2020-09-14 DIAGNOSIS — Z91.018 FOOD ALLERGY: ICD-10-CM

## 2020-09-14 PROCEDURE — 36415 COLL VENOUS BLD VENIPUNCTURE: CPT | Mod: PO

## 2020-09-14 PROCEDURE — 86003 ALLG SPEC IGE CRUDE XTRC EA: CPT | Mod: 59

## 2020-09-14 PROCEDURE — 82785 ASSAY OF IGE: CPT

## 2020-09-14 PROCEDURE — 86003 ALLG SPEC IGE CRUDE XTRC EA: CPT

## 2020-09-15 ENCOUNTER — TELEPHONE (OUTPATIENT)
Dept: ALLERGY | Facility: CLINIC | Age: 40
End: 2020-09-15

## 2020-09-15 ENCOUNTER — PATIENT OUTREACH (OUTPATIENT)
Dept: ADMINISTRATIVE | Facility: OTHER | Age: 40
End: 2020-09-15

## 2020-09-15 LAB — IGE SERPL-ACNC: 69 IU/ML (ref 0–100)

## 2020-09-15 NOTE — TELEPHONE ENCOUNTER
"Spoke with patient regarding reason for visit with Dr. Cazares on 9/16/2020. Appointment notes states patient is being seen for atopic dermatitis. Explained to patient that we do not treat rashes nor atopic dermatitis and that he would need to be referred to a dermatologist. " Well my doctor thinks the rash was an allergic reaction to foods. I had l tingling on my mouth and rash everywhere after eating chick adal. My doctor ordered blood work for allergies and I did that yesterday. " Explained to patient that we can see him to evaluate possible food allergies.   "

## 2020-09-16 ENCOUNTER — OFFICE VISIT (OUTPATIENT)
Dept: ALLERGY | Facility: CLINIC | Age: 40
End: 2020-09-16
Payer: COMMERCIAL

## 2020-09-16 VITALS — RESPIRATION RATE: 16 BRPM | HEIGHT: 68 IN | BODY MASS INDEX: 29.73 KG/M2 | WEIGHT: 196.19 LBS

## 2020-09-16 DIAGNOSIS — L50.8 ACUTE URTICARIA: Primary | ICD-10-CM

## 2020-09-16 DIAGNOSIS — J31.0 CHRONIC RHINITIS: ICD-10-CM

## 2020-09-16 PROCEDURE — 99244 OFF/OP CNSLTJ NEW/EST MOD 40: CPT | Mod: S$GLB,,, | Performed by: STUDENT IN AN ORGANIZED HEALTH CARE EDUCATION/TRAINING PROGRAM

## 2020-09-16 PROCEDURE — 99244 PR OFFICE CONSULTATION,LEVEL IV: ICD-10-PCS | Mod: S$GLB,,, | Performed by: STUDENT IN AN ORGANIZED HEALTH CARE EDUCATION/TRAINING PROGRAM

## 2020-09-16 PROCEDURE — 99999 PR PBB SHADOW E&M-EST. PATIENT-LVL III: ICD-10-PCS | Mod: PBBFAC,,, | Performed by: STUDENT IN AN ORGANIZED HEALTH CARE EDUCATION/TRAINING PROGRAM

## 2020-09-16 PROCEDURE — 99999 PR PBB SHADOW E&M-EST. PATIENT-LVL III: CPT | Mod: PBBFAC,,, | Performed by: STUDENT IN AN ORGANIZED HEALTH CARE EDUCATION/TRAINING PROGRAM

## 2020-09-16 RX ORDER — AZELASTINE 1 MG/ML
2 SPRAY, METERED NASAL 2 TIMES DAILY PRN
Qty: 30 ML | Refills: 11 | Status: SHIPPED | OUTPATIENT
Start: 2020-09-16 | End: 2020-10-22 | Stop reason: SDUPTHER

## 2020-09-16 NOTE — PROGRESS NOTES
Allergy Clinic Note  [facility]    Subjective:      Patient ID: Ryan Stephens Jr. is a 40 y.o. male.    Chief Complaint: Allergic Reaction and Urticaria      Referring Provider: Katia Cadena    History of Present Illness:  40-year-old male referred from Family Medicine for concerns about food allergy manifest primarily as hives.  He is here alone, and he is a good historian.    He has photograph of classic urticarial welts.    Related medications  Status post parental and oral steroids  Status post Benadryl and famotidine    Client states he was well until the morning of September 1st when he ate a Chick Camacho a muffin and some ice coffee.  About 20 min later he noted that his top left live felt rough.  A little later he felt some tiny bumps on the inside of his lip but was not able to see anything.  Later that day he developed mild hives.  The hives became diffuse an unhelped by Benadryl so he presented to the emergency room where he was treated with IM and oral steroids as well as H1 and H2 blockers.  Total duration of hives was approximately 4 days.  He has no symptoms currently.  He has no previous history of hives.  He had some blood work done by his PCP.    Since around the onset of urticaria, client...  Denies fever, shakes, or chills  Reports a drop in weight in energy level after hive onset due to severely restricted diet.  Denies change in appetite  Denies change in the texture of her hair, skin, or nails  Denies change in the appearance of urine or stool  Admits mild diarrhea following the hives while on a liquid diet  Denies vomiting, constipation, or abdominal pain  Denies abnormal bleeding  Admits a couple episodes of pulsating sensation of his left in her elbow, now resolved  Denies any other new aches or pains, specifically myalgias or arthralgia,   Denies any unusual moles        Client also has a history of rhinitis manifest by sneezing, itchy eyes and postnasal drip  sensation and throat clearing on an intermittent basis.  He denies nasal congestion, runny nose, red/runny eyes, coughing, ear pain.  He has also had a couple episodes of disequilibrium attributed to inner ear problems.  There is no seasonal pattern.  There are no clear precipitants.  His not found any helpful medications.  He has disliked using nose sprays in the past.    He also reports that flying on airplanes usually causes significant ear pain.      Additional History:   Past medical history is unremarkable.  No Hx of ENT surgery.  Family history is negative for hives, angioedema, and asthma.  He has a sister with allergies.  Client  reports that he has never smoked. He has never used smokeless tobacco.  Exposures are unremarkable.  No exposure to smoke, pets, or mold.  He is leaving on a 10 day trip tomorrow.     Patient Active Problem List   Diagnosis    Allergic contact dermatitis     Current Outpatient Medications on File Prior to Visit   Medication Sig Dispense Refill    EPINEPHrine (EPIPEN) 0.3 mg/0.3 mL AtIn Inject 0.3 mLs (0.3 mg total) into the muscle as needed. 1 Device 0    famotidine (PEPCID) 20 MG tablet Take 1 tablet (20 mg total) by mouth 2 (two) times daily. (Patient not taking: Reported on 9/16/2020) 6 tablet 0    methylPREDNISolone (MEDROL DOSEPACK) 4 mg tablet Take as directed (Patient not taking: Reported on 9/16/2020) 1 Package 0     No current facility-administered medications on file prior to visit.          Review of Systems   Constitutional: Negative for chills and fever.   HENT: Negative for ear discharge and nosebleeds.         Postnasal drip sensation, throat clearing, sneezing   Eyes: Negative for discharge and redness.        Itchy eyes   Respiratory: Negative for cough, hemoptysis, sputum production, shortness of breath, wheezing and stridor.    Cardiovascular: Negative for chest pain and palpitations.   Gastrointestinal: Negative for blood in stool, melena and vomiting.  "  Genitourinary: Negative for dysuria, flank pain and hematuria.   Skin: Negative for itching and rash.   Neurological: Negative for seizures and loss of consciousness.   Endo/Heme/Allergies: Negative for polydipsia. Does not bruise/bleed easily.       Objective:   Resp 16   Ht 5' 8" (1.727 m)   Wt 89 kg (196 lb 3.4 oz)   BMI 29.83 kg/m²       Physical Exam   Constitutional: He is well-developed, well-nourished, and in no distress.   HENT:   Head: Normocephalic and atraumatic.   Nose: Nose normal.   Mouth/Throat: No oropharyngeal exudate.   Nares pale with severe swelling.  Oropharynx benign without exudate.  Tongue is not coated.   Eyes: Conjunctivae are normal. No scleral icterus.   Neck: Neck supple. No thyromegaly present.   Cardiovascular: Normal rate, regular rhythm, normal heart sounds and intact distal pulses.   Pulmonary/Chest: Effort normal and breath sounds normal. No stridor. No respiratory distress. He has no wheezes.   Abdominal: He exhibits no distension.   Musculoskeletal:         General: No deformity or edema.   Lymphadenopathy:     He has no cervical adenopathy.   Neurological: He is alert. GCS score is 15.   Skin: No rash noted. No erythema.   Psychiatric: Memory and affect normal.       Data:   Labs (09/14/2020)  Food Immunocaps pending for coffee, turkey, soybean, milk, pork, egg, wheat, and chicken  Total serum IgE 69    Labs (05/08/2020)  Normal TSH  Normal vitamin-D  Negative Immunocap to soybean and cow's milk  Normal celiac testing  Unremarkable CMP  Unremarkable CBC  Eo count 200    Assessment:     1. Acute urticaria    2. Chronic rhinitis        Plan:     Medical decision making:  Patient is presenting following an episode of acute urticaria.  There is no associated angioedema, laryngeal edema or anaphylaxis.  Based on history physical and recent labs, no etiology is evident.  We discussed the natural history and I offered reassurance that episode lasted too long to be due to food " allergy, insect allergy, or drug allergy..  He already has medicines to take if symptoms recur.        Client also has a significant history of rhinitis symptoms occurring on intermittent basis.  I recommend screening for allergies with Immunocap.  Therapeutically we discussed several options.  In the end he chose azelastine as needed.  For expectation of ear problems during his upcoming airline flight, I recommended Sudafed 30-60 mg at least 45 min prior to takeoff.    Ryan was seen today for allergic reaction and urticaria.    Diagnoses and all orders for this visit:    Acute urticaria - resolved, idiopathic  -     Ambulatory referral/consult to Allergy    Chronic rhinitis - moderate  -     IgE; Future  -     Dermatophagoides Baldwin; Future  -     Dermatophagoides Pteronyssinus; Future  -     Bermuda; Future  -     Tiburcio; Future  -     Dike; Future  -     English Plantain; Future  -     Oak Pecan; Future  -     Ragweed; Future  -     Alternaria; Future  -     Aspergillus; Future  -     Cat; Future  -     Cockroach; Future  -     Dog; Future  -     azelastine (ASTELIN) 137 mcg (0.1 %) nasal spray; 2 sprays (274 mcg total) by Nasal route 2 (two) times daily as needed for Rhinitis. Do not snort (because it tastes bad).        Patient Instructions   Testing  Blood work for allergy testing today       Check [facility] in one week for results or call [phone]       Contact me with questions or concerns       I will contact you if anything needs immediate attention.        Treatment    Astelin = azelastine nasal spray:    2 squirts each nostril as needed, up to twice a day   Do not snort (because it burns and tastes bad)    Sudafed= pseudoephedrine 30-60 mg about 45 minutes before flying  Request at the pharmacy counter        Follow up in about 4 weeks (around 10/14/2020).    Larisa Cazares MD

## 2020-09-16 NOTE — LETTER
September 16, 2020      Katia Cadena MD  [address] [place] [address]           [facility]  [address] [facility], [address]  [facility] [place] [address]  Phone: [phone]  Fax: [phone]          Patient: Ryan Stephens Jr.   MR Number: [id number]   YOB: 1980   Date of Visit: 9/16/2020       Dear Dr. Katia Cadena:    Thank you for referring Ryan Stephens to me for evaluation. Attached you will find relevant portions of my assessment and plan of care.    If you have questions, please do not hesitate to call me. I look forward to following Ryan Stephens along with you.    Sincerely,    Larisa Cazares MD    Enclosure  CC:  No Recipients    If you would like to receive this communication electronically, please contact [email or web address] or [phone] to request more information on [facility] access.    For providers and/or their staff who would like to refer a patient to [facility], please contact us through our one-stop-shop provider referral line, [facility], at [phone].    If you feel you have received this communication in error or would no longer like to receive these types of communications, please e-mail [email or web address]

## 2020-09-16 NOTE — PATIENT INSTRUCTIONS
Testing  Blood work for allergy testing today       Check [facility] in one week for results or call [phone]       Contact me with questions or concerns       I will contact you if anything needs immediate attention.        Treatment    Astelin = azelastine nasal spray:    2 squirts each nostril as needed, up to twice a day   Do not snort (because it burns and tastes bad)    Sudafed= pseudoephedrine 30-60 mg about 45 minutes before flying  Request at the pharmacy counter

## 2020-09-18 LAB
CHICKEN CLASS: NORMAL
CHICKEN IGE QN: <0.1 KU/L
COFFEE IGE QN: <0.1 KU/L
COW MILK IGE QN: <0.1 KU/L
DEPRECATED COFFEE IGE RAST QL: NORMAL
DEPRECATED COW MILK IGE RAST QL: NORMAL
DEPRECATED EGG YOLK IGE RAST QL: NORMAL
DEPRECATED PORK IGE RAST QL: NORMAL
DEPRECATED SOYBEAN IGE RAST QL: NORMAL
DEPRECATED TURKEY MEAT IGE RAST QL: NORMAL
DEPRECATED WHEAT IGE RAST QL: ABNORMAL
EGG YOLK IGE QN: <0.1 KU/L
PORK IGE QN: <0.1 KU/L
SOYBEAN IGE QN: <0.1 KU/L
TURKEY MEAT IGE QN: <0.1 KU/L
WHEAT IGE QN: 0.28 KU/L

## 2020-09-29 ENCOUNTER — LAB VISIT (OUTPATIENT)
Dept: LAB | Facility: HOSPITAL | Age: 40
End: 2020-09-29
Attending: STUDENT IN AN ORGANIZED HEALTH CARE EDUCATION/TRAINING PROGRAM
Payer: COMMERCIAL

## 2020-09-29 DIAGNOSIS — J31.0 CHRONIC RHINITIS: ICD-10-CM

## 2020-09-29 LAB — IGE SERPL-ACNC: 61 IU/ML (ref 0–100)

## 2020-09-29 PROCEDURE — 86003 ALLG SPEC IGE CRUDE XTRC EA: CPT | Mod: 59

## 2020-09-29 PROCEDURE — 86003 ALLG SPEC IGE CRUDE XTRC EA: CPT

## 2020-09-29 PROCEDURE — 82785 ASSAY OF IGE: CPT

## 2020-09-29 PROCEDURE — 36415 COLL VENOUS BLD VENIPUNCTURE: CPT | Mod: PN

## 2020-10-01 ENCOUNTER — PATIENT MESSAGE (OUTPATIENT)
Dept: ALLERGY | Facility: CLINIC | Age: 40
End: 2020-10-01

## 2020-10-02 LAB
A ALTERNATA IGE QN: 0.29 KU/L
A FUMIGATUS IGE QN: <0.1 KU/L
BERMUDA GRASS IGE QN: 3.23 KU/L
CAT DANDER IGE QN: <0.1 KU/L
CEDAR IGE QN: <0.1 KU/L
D FARINAE IGE QN: 1.28 KU/L
D PTERONYSS IGE QN: 1.47 KU/L
DEPRECATED A ALTERNATA IGE RAST QL: ABNORMAL
DEPRECATED A FUMIGATUS IGE RAST QL: NORMAL
DEPRECATED BERMUDA GRASS IGE RAST QL: ABNORMAL
DEPRECATED CAT DANDER IGE RAST QL: NORMAL
DEPRECATED CEDAR IGE RAST QL: NORMAL
DEPRECATED D FARINAE IGE RAST QL: ABNORMAL
DEPRECATED D PTERONYSS IGE RAST QL: ABNORMAL
DEPRECATED DOG DANDER IGE RAST QL: NORMAL
DEPRECATED ENGL PLANTAIN IGE RAST QL: NORMAL
DEPRECATED ROACH IGE RAST QL: NORMAL
DEPRECATED TIMOTHY IGE RAST QL: ABNORMAL
DEPRECATED WEST RAGWEED IGE RAST QL: ABNORMAL
DEPRECATED WHITE OAK IGE RAST QL: NORMAL
DOG DANDER IGE QN: <0.1 KU/L
ENGL PLANTAIN IGE QN: <0.1 KU/L
ROACH IGE QN: <0.1 KU/L
TIMOTHY IGE QN: 5.14 KU/L
WEST RAGWEED IGE QN: 0.12 KU/L
WHITE OAK IGE QN: <0.1 KU/L

## 2020-10-21 ENCOUNTER — PATIENT OUTREACH (OUTPATIENT)
Dept: ADMINISTRATIVE | Facility: OTHER | Age: 40
End: 2020-10-21

## 2020-10-22 ENCOUNTER — PATIENT MESSAGE (OUTPATIENT)
Dept: ALLERGY | Facility: CLINIC | Age: 40
End: 2020-10-22

## 2020-10-22 ENCOUNTER — OFFICE VISIT (OUTPATIENT)
Dept: ALLERGY | Facility: CLINIC | Age: 40
End: 2020-10-22
Payer: COMMERCIAL

## 2020-10-22 DIAGNOSIS — J30.9 CHRONIC ALLERGIC RHINITIS: ICD-10-CM

## 2020-10-22 DIAGNOSIS — J30.1 NON-SEASONAL ALLERGIC RHINITIS DUE TO POLLEN: ICD-10-CM

## 2020-10-22 DIAGNOSIS — J31.0 CHRONIC RHINITIS: ICD-10-CM

## 2020-10-22 DIAGNOSIS — J30.89 ALLERGIC RHINITIS DUE TO HOUSE DUST MITE: Primary | ICD-10-CM

## 2020-10-22 PROCEDURE — 99214 PR OFFICE/OUTPT VISIT, EST, LEVL IV, 30-39 MIN: ICD-10-PCS | Mod: 95,,, | Performed by: STUDENT IN AN ORGANIZED HEALTH CARE EDUCATION/TRAINING PROGRAM

## 2020-10-22 PROCEDURE — 99214 OFFICE O/P EST MOD 30 MIN: CPT | Mod: 95,,, | Performed by: STUDENT IN AN ORGANIZED HEALTH CARE EDUCATION/TRAINING PROGRAM

## 2020-10-22 RX ORDER — AZELASTINE 1 MG/ML
2 SPRAY, METERED NASAL 2 TIMES DAILY PRN
Qty: 30 ML | Refills: 11 | Status: SHIPPED | OUTPATIENT
Start: 2020-10-22 | End: 2022-02-24 | Stop reason: SDUPTHER

## 2020-10-22 NOTE — PROGRESS NOTES
Allergy Clinic Note  [facility]  The patient location is:   [place]  The chief complaint leading to consultation is:  Nasal symptoms    Visit type: audiovisual    Face to Face time with patient: 11 minutes  25 minutes minutes of total time spent on the encounter, which includes face to face time and non-face to face time preparing to see the patient (eg, review of tests), Obtaining and/or reviewing separately obtained history, Documenting clinical information in the electronic or other health record, Independently interpreting results (not separately reported) and communicating results to the patient/family/caregiver, or Care coordination (not separately reported).     Each patient to whom he or she provides medical services by telemedicine is:  (1) informed of the relationship between the physician and patient and the respective role of any other health care provider with respect to management of the patient; and (2) notified that he or she may decline to receive medical services by telemedicine and may withdraw from such care at any time.    Subjective:      Patient ID: Ryan Stephens Jr. is a 40 y.o. male.    Chief Complaint: No chief complaint on file.      Allergy Problem List:     S/p acute urticaria  Chronic rhinitis    History of Present Illness:  40-year-old male referred from Family Medicine for concerns about food allergy manifest primarily as hives.  He is here alone, and he is a good historian.     at his initial visit he he had a photograph of classic urticarial welts.    Related medications  Claritin alternating with other antihistamines  Azelastine 2 sen BID prn    Client reports he has had no further hives.    He reports improvement on the azelastine nasal spray.  In particular he notices that he is sleep quality and quantity have improved.     He also notes an improvement in his vertigo.   He continues to have some popping in his ears.   He definitely thinks that the  azelastine is worth continuing and he requests a refill.      No other problems or complaints    Client states he was well until the morning of September 1st when he ate a [employer] a muffin and some ice coffee.  About 20 min later he noted that his top left live felt rough.  A little later he felt some tiny bumps on the inside of his lip but was not able to see anything.  Later that day he developed mild hives.  The hives became diffuse an unhelped by Benadryl so he presented to the emergency room where he was treated with IM and oral steroids as well as H1 and H2 blockers.  Total duration of hives was approximately 4 days.  He has no symptoms currently.  He has no previous history of hives.  He had some blood work done by his PCP.    Since around the onset of urticaria, client...  Denies fever, shakes, or chills  Reports a drop in weight in energy level after hive onset due to severely restricted diet.  Denies change in appetite  Denies change in the texture of her hair, skin, or nails  Denies change in the appearance of urine or stool  Admits mild diarrhea following the hives while on a liquid diet  Denies vomiting, constipation, or abdominal pain  Denies abnormal bleeding  Admits a couple episodes of pulsating sensation of his left in her elbow, now resolved  Denies any other new aches or pains, specifically myalgias or arthralgia,   Denies any unusual moles        Client also has a history of rhinitis manifest by sneezing, itchy eyes and postnasal drip sensation and throat clearing on an intermittent basis.  He denies nasal congestion, runny nose, red/runny eyes, coughing, ear pain.  He has also had a couple episodes of disequilibrium attributed to inner ear problems.  There is no seasonal pattern.  There are no clear precipitants.  His not found any helpful medications.  He has disliked using nose sprays in the past.    He also reports that flying on airplanes usually causes significant ear  pain.      Additional History:   Past medical history is unremarkable.  No Hx of ENT surgery.  Family history is negative for hives, angioedema, and asthma.  He has a sister with allergies.  Client  reports that he has never smoked. He has never used smokeless tobacco.  Exposures are unremarkable.  No exposure to smoke, pets, or mold.  He is leaving on a 10 day trip tomorrow.     Patient Active Problem List   Diagnosis    Allergic contact dermatitis     Current Outpatient Medications on File Prior to Visit   Medication Sig Dispense Refill    EPINEPHrine (EPIPEN) 0.3 mg/0.3 mL AtIn Inject 0.3 mLs (0.3 mg total) into the muscle as needed. 1 Device 0    famotidine (PEPCID) 20 MG tablet Take 1 tablet (20 mg total) by mouth 2 (two) times daily. (Patient not taking: Reported on 9/16/2020) 6 tablet 0    [DISCONTINUED] azelastine (ASTELIN) 137 mcg (0.1 %) nasal spray 2 sprays (274 mcg total) by Nasal route 2 (two) times daily as needed for Rhinitis. Do not snort (because it tastes bad). 30 mL 11     No current facility-administered medications on file prior to visit.          Review of Systems   Constitutional: Negative for chills and fever.   HENT: Negative for ear discharge and nosebleeds.    Eyes: Negative for discharge and redness.   Respiratory: Negative for hemoptysis, sputum production, shortness of breath, wheezing and stridor.    Cardiovascular: Negative for palpitations.   Gastrointestinal: Negative for blood in stool, melena and vomiting.   Genitourinary: Negative for dysuria, flank pain and hematuria.   Musculoskeletal: Negative for joint pain and myalgias.   Skin: Negative for itching and rash.   Neurological: Positive for dizziness (Vertigo with  some movements). Negative for seizures and loss of consciousness.        Improved sleep quality   Endo/Heme/Allergies: Positive for environmental allergies.       Objective:   There were no vitals taken for this visit.      Physical Exam    awake and alert   no  respiratory distress   cheerful   speech fluent and logical      Data:   Aeroallergen testing by the Immunocap method  (09/29/2020) was positive for dust mites and grass.   Class III  [place] grass    Class II    [place] grass, dust mites (Df and Dp)   all other aeroallergens less than 0.35 KU / L. Ragweed 0.12, Alternaria 0.29  Total serum IgE 61    Labs (09/14/2020)  Food Immunocaps  Negative for coffee, turkey, soybean, milk, pork, egg, wheat, and chicken  Total serum IgE 69    Labs (05/08/2020)  Normal TSH  Normal vitamin-D  Negative Immunocap to soybean and cow's milk  Normal celiac testing  Unremarkable CMP  Unremarkable CBC  Eo count 200    Assessment:     1. Allergic rhinitis due to house dust mite    2. Non-seasonal allergic rhinitis due to grass pollen    3. Chronic rhinitis    4. Chronic allergic rhinitis        Plan:     Medical decision making:   Patient's isolated episode of urticaria has resolved and not recurred.         His rhinitis symptoms appear due to a combination of dust allergy and grass allergy.  Symptoms have responded to azelastine nasal spray when combined with an oral antihistamine.  I recommend continuing these.  I also recommended dust avoidance measures.  Ryan was seen today for allergic reaction and urticaria.    Diagnoses and all orders for this visit:    Acute urticaria - resolved, idiopathic    Follow     chronic allergic rhinitis   allergic rhinitis due to dust mite   allergic rhinitis due to grass  -   Results discussed  -    Dust avoidance measures discussed with visual props  -     Individualized written instructions given  -     azelastine (ASTELIN) 137 mcg (0.1 %) nasal spray; 2 sprays (274 mcg total) by Nasal route 2 (two) times daily as needed for Rhinitis. Do not snort (because it tastes bad).        Patient Instructions    Allergic to dust mites and grass     no mowing grass    Recommend these dust avoidance measures:  Dust proof covers on all pillows that stay on the  bed at night.  No stuffed animals on the bed at night.  No feathers or down on the bed  Wash bedding in hot water  Take a pillow cover (or your pillow) for vacations  Optional:  Dust proof cover on mattress.       continue azelastine     return as needed          Follow up if symptoms worsen or fail to improve.    Larisa Cazares MD      Education > 50% of visit

## 2020-10-22 NOTE — PATIENT INSTRUCTIONS
Allergic to dust mites and grass     no mowing grass    Recommend these dust avoidance measures:  Dust proof covers on all pillows that stay on the bed at night.  No stuffed animals on the bed at night.  No feathers or down on the bed  Wash bedding in hot water  Take a pillow cover (or your pillow) for vacations  Optional:  Dust proof cover on mattress.       continue azelastine     return as needed

## 2021-02-28 ENCOUNTER — HOSPITAL ENCOUNTER (EMERGENCY)
Facility: HOSPITAL | Age: 41
Discharge: HOME | End: 2021-02-28
Attending: EMERGENCY MEDICINE
Payer: COMMERCIAL

## 2021-02-28 VITALS
WEIGHT: 205 LBS | HEART RATE: 104 BPM | BODY MASS INDEX: 32.95 KG/M2 | RESPIRATION RATE: 18 BRPM | OXYGEN SATURATION: 97 % | TEMPERATURE: 99 F | DIASTOLIC BLOOD PRESSURE: 94 MMHG | HEIGHT: 66 IN | SYSTOLIC BLOOD PRESSURE: 130 MMHG

## 2021-02-28 DIAGNOSIS — R05.9 COUGH: ICD-10-CM

## 2021-02-28 DIAGNOSIS — J98.8 CONGESTION OF UPPER AIRWAY: Primary | ICD-10-CM

## 2021-02-28 DIAGNOSIS — J06.9 VIRAL URI WITH COUGH: ICD-10-CM

## 2021-02-28 LAB
CTP QC/QA: YES
SARS-COV-2 RDRP RESP QL NAA+PROBE: NEGATIVE

## 2021-02-28 PROCEDURE — 99285 EMERGENCY DEPT VISIT HI MDM: CPT | Mod: CR,CS,, | Performed by: EMERGENCY MEDICINE

## 2021-02-28 PROCEDURE — 99283 EMERGENCY DEPT VISIT LOW MDM: CPT | Mod: 25

## 2021-02-28 PROCEDURE — 25000003 PHARM REV CODE 250: Performed by: EMERGENCY MEDICINE

## 2021-02-28 PROCEDURE — 99285 PR EMERGENCY DEPT VISIT,LEVEL V: ICD-10-PCS | Mod: CR,CS,, | Performed by: EMERGENCY MEDICINE

## 2021-02-28 PROCEDURE — U0002 COVID-19 LAB TEST NON-CDC: HCPCS | Performed by: EMERGENCY MEDICINE

## 2021-02-28 RX ORDER — GUAIFENESIN AND DEXTROMETHORPHAN HYDROBROMIDE 600; 30 MG/1; MG/1
1 TABLET, EXTENDED RELEASE ORAL 2 TIMES DAILY PRN
Qty: 20 TABLET | Refills: 0 | Status: SHIPPED | OUTPATIENT
Start: 2021-02-28 | End: 2021-03-10

## 2021-02-28 RX ORDER — GUAIFENESIN 100 MG/5ML
200 SOLUTION ORAL
Status: COMPLETED | OUTPATIENT
Start: 2021-02-28 | End: 2021-02-28

## 2021-02-28 RX ADMIN — GUAIFENESIN 200 MG: 200 SOLUTION ORAL at 01:02

## 2021-03-05 ENCOUNTER — OFFICE VISIT (OUTPATIENT)
Dept: PRIMARY CARE CLINIC | Facility: CLINIC | Age: 41
End: 2021-03-05
Payer: COMMERCIAL

## 2021-03-05 VITALS
BODY MASS INDEX: 33.89 KG/M2 | WEIGHT: 210.88 LBS | OXYGEN SATURATION: 97 % | TEMPERATURE: 97 F | SYSTOLIC BLOOD PRESSURE: 132 MMHG | DIASTOLIC BLOOD PRESSURE: 88 MMHG | RESPIRATION RATE: 16 BRPM | HEART RATE: 79 BPM | HEIGHT: 66 IN

## 2021-03-05 DIAGNOSIS — J06.9 UPPER RESPIRATORY TRACT INFECTION, UNSPECIFIED TYPE: Primary | ICD-10-CM

## 2021-03-05 PROCEDURE — 99999 PR PBB SHADOW E&M-EST. PATIENT-LVL III: CPT | Mod: PBBFAC,,, | Performed by: FAMILY MEDICINE

## 2021-03-05 PROCEDURE — 1126F AMNT PAIN NOTED NONE PRSNT: CPT | Mod: S$GLB,,, | Performed by: FAMILY MEDICINE

## 2021-03-05 PROCEDURE — 99999 PR PBB SHADOW E&M-EST. PATIENT-LVL III: ICD-10-PCS | Mod: PBBFAC,,, | Performed by: FAMILY MEDICINE

## 2021-03-05 PROCEDURE — 3008F BODY MASS INDEX DOCD: CPT | Mod: CPTII,S$GLB,, | Performed by: FAMILY MEDICINE

## 2021-03-05 PROCEDURE — 99213 OFFICE O/P EST LOW 20 MIN: CPT | Mod: S$GLB,,, | Performed by: FAMILY MEDICINE

## 2021-03-05 PROCEDURE — 99213 PR OFFICE/OUTPT VISIT, EST, LEVL III, 20-29 MIN: ICD-10-PCS | Mod: S$GLB,,, | Performed by: FAMILY MEDICINE

## 2021-03-05 PROCEDURE — 1126F PR PAIN SEVERITY QUANTIFIED, NO PAIN PRESENT: ICD-10-PCS | Mod: S$GLB,,, | Performed by: FAMILY MEDICINE

## 2021-03-05 PROCEDURE — 3008F PR BODY MASS INDEX (BMI) DOCUMENTED: ICD-10-PCS | Mod: CPTII,S$GLB,, | Performed by: FAMILY MEDICINE

## 2021-03-05 RX ORDER — CODEINE PHOSPHATE AND GUAIFENESIN 10; 100 MG/5ML; MG/5ML
5 SOLUTION ORAL EVERY 6 HOURS PRN
Qty: 120 ML | Refills: 0 | Status: SHIPPED | OUTPATIENT
Start: 2021-03-05 | End: 2021-03-18

## 2021-03-18 ENCOUNTER — OFFICE VISIT (OUTPATIENT)
Dept: FAMILY MEDICINE | Facility: CLINIC | Age: 41
End: 2021-03-18
Payer: COMMERCIAL

## 2021-03-18 ENCOUNTER — LAB VISIT (OUTPATIENT)
Dept: LAB | Facility: HOSPITAL | Age: 41
End: 2021-03-18
Attending: FAMILY MEDICINE
Payer: COMMERCIAL

## 2021-03-18 VITALS
HEIGHT: 66 IN | BODY MASS INDEX: 32.95 KG/M2 | SYSTOLIC BLOOD PRESSURE: 118 MMHG | DIASTOLIC BLOOD PRESSURE: 84 MMHG | TEMPERATURE: 99 F | HEART RATE: 80 BPM | OXYGEN SATURATION: 96 % | WEIGHT: 205 LBS

## 2021-03-18 DIAGNOSIS — J18.9 ATYPICAL PNEUMONIA: ICD-10-CM

## 2021-03-18 DIAGNOSIS — Z00.00 ANNUAL PHYSICAL EXAM: ICD-10-CM

## 2021-03-18 DIAGNOSIS — G47.00 INSOMNIA, UNSPECIFIED TYPE: ICD-10-CM

## 2021-03-18 DIAGNOSIS — K21.9 GASTROESOPHAGEAL REFLUX DISEASE, UNSPECIFIED WHETHER ESOPHAGITIS PRESENT: Primary | ICD-10-CM

## 2021-03-18 LAB
ALBUMIN SERPL BCP-MCNC: 4 G/DL (ref 3.5–5.2)
ALP SERPL-CCNC: 75 U/L (ref 55–135)
ALT SERPL W/O P-5'-P-CCNC: 27 U/L (ref 10–44)
ANION GAP SERPL CALC-SCNC: 8 MMOL/L (ref 8–16)
AST SERPL-CCNC: 24 U/L (ref 10–40)
BASOPHILS # BLD AUTO: 0.02 K/UL (ref 0–0.2)
BASOPHILS NFR BLD: 0.4 % (ref 0–1.9)
BILIRUB SERPL-MCNC: 0.4 MG/DL (ref 0.1–1)
BUN SERPL-MCNC: 9 MG/DL (ref 6–20)
CALCIUM SERPL-MCNC: 9.4 MG/DL (ref 8.7–10.5)
CHLORIDE SERPL-SCNC: 102 MMOL/L (ref 95–110)
CHOLEST SERPL-MCNC: 228 MG/DL (ref 120–199)
CHOLEST/HDLC SERPL: 5.6 {RATIO} (ref 2–5)
CO2 SERPL-SCNC: 30 MMOL/L (ref 23–29)
CREAT SERPL-MCNC: 0.9 MG/DL (ref 0.5–1.4)
DIFFERENTIAL METHOD: ABNORMAL
EOSINOPHIL # BLD AUTO: 0.2 K/UL (ref 0–0.5)
EOSINOPHIL NFR BLD: 4 % (ref 0–8)
ERYTHROCYTE [DISTWIDTH] IN BLOOD BY AUTOMATED COUNT: 13.6 % (ref 11.5–14.5)
EST. GFR  (AFRICAN AMERICAN): >60 ML/MIN/1.73 M^2
EST. GFR  (NON AFRICAN AMERICAN): >60 ML/MIN/1.73 M^2
GLUCOSE SERPL-MCNC: 90 MG/DL (ref 70–110)
HCT VFR BLD AUTO: 42.6 % (ref 40–54)
HDLC SERPL-MCNC: 41 MG/DL (ref 40–75)
HDLC SERPL: 18 % (ref 20–50)
HGB BLD-MCNC: 13.7 G/DL (ref 14–18)
IMM GRANULOCYTES # BLD AUTO: 0.01 K/UL (ref 0–0.04)
IMM GRANULOCYTES NFR BLD AUTO: 0.2 % (ref 0–0.5)
LDLC SERPL CALC-MCNC: 156.4 MG/DL (ref 63–159)
LYMPHOCYTES # BLD AUTO: 2 K/UL (ref 1–4.8)
LYMPHOCYTES NFR BLD: 39.5 % (ref 18–48)
MCH RBC QN AUTO: 26.1 PG (ref 27–31)
MCHC RBC AUTO-ENTMCNC: 32.2 G/DL (ref 32–36)
MCV RBC AUTO: 81 FL (ref 82–98)
MONOCYTES # BLD AUTO: 0.4 K/UL (ref 0.3–1)
MONOCYTES NFR BLD: 7.9 % (ref 4–15)
NEUTROPHILS # BLD AUTO: 2.4 K/UL (ref 1.8–7.7)
NEUTROPHILS NFR BLD: 48 % (ref 38–73)
NONHDLC SERPL-MCNC: 187 MG/DL
NRBC BLD-RTO: 0 /100 WBC
PLATELET # BLD AUTO: 289 K/UL (ref 150–350)
PMV BLD AUTO: 11 FL (ref 9.2–12.9)
POTASSIUM SERPL-SCNC: 4.3 MMOL/L (ref 3.5–5.1)
PROT SERPL-MCNC: 8.1 G/DL (ref 6–8.4)
RBC # BLD AUTO: 5.24 M/UL (ref 4.6–6.2)
SODIUM SERPL-SCNC: 140 MMOL/L (ref 136–145)
TRIGL SERPL-MCNC: 153 MG/DL (ref 30–150)
TSH SERPL DL<=0.005 MIU/L-ACNC: 0.76 UIU/ML (ref 0.4–4)
WBC # BLD AUTO: 5.04 K/UL (ref 3.9–12.7)

## 2021-03-18 PROCEDURE — 36415 COLL VENOUS BLD VENIPUNCTURE: CPT | Mod: PO | Performed by: FAMILY MEDICINE

## 2021-03-18 PROCEDURE — 1126F PR PAIN SEVERITY QUANTIFIED, NO PAIN PRESENT: ICD-10-PCS | Mod: S$GLB,,, | Performed by: FAMILY MEDICINE

## 2021-03-18 PROCEDURE — 99999 PR PBB SHADOW E&M-EST. PATIENT-LVL III: ICD-10-PCS | Mod: PBBFAC,,, | Performed by: FAMILY MEDICINE

## 2021-03-18 PROCEDURE — 80061 LIPID PANEL: CPT | Performed by: FAMILY MEDICINE

## 2021-03-18 PROCEDURE — 1126F AMNT PAIN NOTED NONE PRSNT: CPT | Mod: S$GLB,,, | Performed by: FAMILY MEDICINE

## 2021-03-18 PROCEDURE — 99214 OFFICE O/P EST MOD 30 MIN: CPT | Mod: S$GLB,,, | Performed by: FAMILY MEDICINE

## 2021-03-18 PROCEDURE — 80053 COMPREHEN METABOLIC PANEL: CPT | Performed by: FAMILY MEDICINE

## 2021-03-18 PROCEDURE — 84443 ASSAY THYROID STIM HORMONE: CPT | Performed by: FAMILY MEDICINE

## 2021-03-18 PROCEDURE — 99214 PR OFFICE/OUTPT VISIT, EST, LEVL IV, 30-39 MIN: ICD-10-PCS | Mod: S$GLB,,, | Performed by: FAMILY MEDICINE

## 2021-03-18 PROCEDURE — 99999 PR PBB SHADOW E&M-EST. PATIENT-LVL III: CPT | Mod: PBBFAC,,, | Performed by: FAMILY MEDICINE

## 2021-03-18 PROCEDURE — 3008F BODY MASS INDEX DOCD: CPT | Mod: CPTII,S$GLB,, | Performed by: FAMILY MEDICINE

## 2021-03-18 PROCEDURE — 85025 COMPLETE CBC W/AUTO DIFF WBC: CPT | Performed by: FAMILY MEDICINE

## 2021-03-18 PROCEDURE — 3008F PR BODY MASS INDEX (BMI) DOCUMENTED: ICD-10-PCS | Mod: CPTII,S$GLB,, | Performed by: FAMILY MEDICINE

## 2021-03-18 PROCEDURE — 86769 SARS-COV-2 COVID-19 ANTIBODY: CPT | Performed by: FAMILY MEDICINE

## 2021-03-18 RX ORDER — AZITHROMYCIN 250 MG/1
TABLET, FILM COATED ORAL
Qty: 6 TABLET | Refills: 0 | Status: SHIPPED | OUTPATIENT
Start: 2021-03-18 | End: 2021-03-23

## 2021-03-18 RX ORDER — TRAZODONE HYDROCHLORIDE 50 MG/1
50 TABLET ORAL NIGHTLY
Qty: 30 TABLET | Refills: 11 | Status: SHIPPED | OUTPATIENT
Start: 2021-03-18 | End: 2022-03-18

## 2021-03-18 RX ORDER — PANTOPRAZOLE SODIUM 40 MG/1
40 TABLET, DELAYED RELEASE ORAL DAILY
Qty: 30 TABLET | Refills: 11 | Status: SHIPPED | OUTPATIENT
Start: 2021-03-18 | End: 2022-03-18

## 2021-03-19 ENCOUNTER — PATIENT MESSAGE (OUTPATIENT)
Dept: FAMILY MEDICINE | Facility: CLINIC | Age: 41
End: 2021-03-19

## 2021-03-19 LAB — SARS-COV-2 IGG SERPLBLD QL IA.RAPID: NEGATIVE

## 2021-03-22 ENCOUNTER — IMMUNIZATION (OUTPATIENT)
Dept: OBSTETRICS AND GYNECOLOGY | Facility: CLINIC | Age: 41
End: 2021-03-22
Payer: COMMERCIAL

## 2021-03-22 ENCOUNTER — PATIENT MESSAGE (OUTPATIENT)
Dept: FAMILY MEDICINE | Facility: CLINIC | Age: 41
End: 2021-03-22

## 2021-03-22 DIAGNOSIS — Z23 NEED FOR VACCINATION: Primary | ICD-10-CM

## 2021-03-22 PROCEDURE — 0001A COVID-19, MRNA, LNP-S, PF, 30 MCG/0.3 ML DOSE VACCINE: CPT | Mod: CV19,S$GLB,, | Performed by: FAMILY MEDICINE

## 2021-03-22 PROCEDURE — 0001A COVID-19, MRNA, LNP-S, PF, 30 MCG/0.3 ML DOSE VACCINE: ICD-10-PCS | Mod: CV19,S$GLB,, | Performed by: FAMILY MEDICINE

## 2021-03-22 PROCEDURE — 91300 COVID-19, MRNA, LNP-S, PF, 30 MCG/0.3 ML DOSE VACCINE: CPT | Mod: S$GLB,,, | Performed by: FAMILY MEDICINE

## 2021-03-22 PROCEDURE — 91300 COVID-19, MRNA, LNP-S, PF, 30 MCG/0.3 ML DOSE VACCINE: ICD-10-PCS | Mod: S$GLB,,, | Performed by: FAMILY MEDICINE

## 2021-03-23 ENCOUNTER — PATIENT MESSAGE (OUTPATIENT)
Dept: FAMILY MEDICINE | Facility: CLINIC | Age: 41
End: 2021-03-23

## 2021-03-26 ENCOUNTER — OFFICE VISIT (OUTPATIENT)
Dept: ALLERGY | Facility: CLINIC | Age: 41
End: 2021-03-26
Payer: COMMERCIAL

## 2021-03-26 DIAGNOSIS — J30.9 CHRONIC ALLERGIC RHINITIS: Primary | ICD-10-CM

## 2021-03-26 DIAGNOSIS — K21.9 GASTROESOPHAGEAL REFLUX DISEASE, UNSPECIFIED WHETHER ESOPHAGITIS PRESENT: ICD-10-CM

## 2021-03-26 DIAGNOSIS — H69.90 DYSFUNCTION OF EUSTACHIAN TUBE, UNSPECIFIED LATERALITY: ICD-10-CM

## 2021-03-26 DIAGNOSIS — Z87.2 H/O URTICARIA: ICD-10-CM

## 2021-03-26 DIAGNOSIS — J30.1 NON-SEASONAL ALLERGIC RHINITIS DUE TO POLLEN: ICD-10-CM

## 2021-03-26 DIAGNOSIS — J30.89 ALLERGIC RHINITIS DUE TO HOUSE DUST MITE: ICD-10-CM

## 2021-03-26 PROCEDURE — 99214 PR OFFICE/OUTPT VISIT, EST, LEVL IV, 30-39 MIN: ICD-10-PCS | Mod: 95,,, | Performed by: STUDENT IN AN ORGANIZED HEALTH CARE EDUCATION/TRAINING PROGRAM

## 2021-03-26 PROCEDURE — 99214 OFFICE O/P EST MOD 30 MIN: CPT | Mod: 95,,, | Performed by: STUDENT IN AN ORGANIZED HEALTH CARE EDUCATION/TRAINING PROGRAM

## 2021-03-26 RX ORDER — FLUTICASONE PROPIONATE 50 MCG
2 SPRAY, SUSPENSION (ML) NASAL 2 TIMES DAILY
Qty: 31.6 ML | Refills: 5 | Status: SHIPPED | OUTPATIENT
Start: 2021-03-26 | End: 2022-02-24 | Stop reason: SDUPTHER

## 2021-04-12 ENCOUNTER — IMMUNIZATION (OUTPATIENT)
Dept: OBSTETRICS AND GYNECOLOGY | Facility: CLINIC | Age: 41
End: 2021-04-12
Payer: COMMERCIAL

## 2021-04-12 DIAGNOSIS — Z23 NEED FOR VACCINATION: Primary | ICD-10-CM

## 2021-04-12 PROCEDURE — 91300 COVID-19, MRNA, LNP-S, PF, 30 MCG/0.3 ML DOSE VACCINE: CPT | Mod: S$GLB,,, | Performed by: FAMILY MEDICINE

## 2021-04-12 PROCEDURE — 0002A COVID-19, MRNA, LNP-S, PF, 30 MCG/0.3 ML DOSE VACCINE: CPT | Mod: CV19,S$GLB,, | Performed by: FAMILY MEDICINE

## 2021-04-12 PROCEDURE — 0002A COVID-19, MRNA, LNP-S, PF, 30 MCG/0.3 ML DOSE VACCINE: ICD-10-PCS | Mod: CV19,S$GLB,, | Performed by: FAMILY MEDICINE

## 2021-04-12 PROCEDURE — 91300 COVID-19, MRNA, LNP-S, PF, 30 MCG/0.3 ML DOSE VACCINE: ICD-10-PCS | Mod: S$GLB,,, | Performed by: FAMILY MEDICINE

## 2022-02-24 DIAGNOSIS — J30.9 CHRONIC ALLERGIC RHINITIS: ICD-10-CM

## 2022-02-24 RX ORDER — AZELASTINE 1 MG/ML
2 SPRAY, METERED NASAL 2 TIMES DAILY PRN
Qty: 30 ML | Refills: 1 | Status: SHIPPED | OUTPATIENT
Start: 2022-02-24 | End: 2022-04-14

## 2022-02-24 RX ORDER — FLUTICASONE PROPIONATE 50 MCG
2 SPRAY, SUSPENSION (ML) NASAL 2 TIMES DAILY
Qty: 31.6 ML | Refills: 5 | Status: SHIPPED | OUTPATIENT
Start: 2022-02-24

## 2022-02-24 NOTE — TELEPHONE ENCOUNTER
"Phone call to patient. Informed patient that both nasal sprays had been called in as requested but will need a follow up in March before any other refills will be given.  "Alright, thank you so much." Patient declined scheduling an appointment with me today while I had him on the line. " Ummmmm, I don't know my work schedule at this time but I can call back to schedule."   "

## 2022-04-14 DIAGNOSIS — J30.9 CHRONIC ALLERGIC RHINITIS: ICD-10-CM

## 2022-04-14 RX ORDER — AZELASTINE 1 MG/ML
SPRAY, METERED NASAL
Qty: 60 ML | Refills: 2 | Status: SHIPPED | OUTPATIENT
Start: 2022-04-14

## 2022-05-30 ENCOUNTER — PATIENT MESSAGE (OUTPATIENT)
Dept: ADMINISTRATIVE | Facility: HOSPITAL | Age: 42
End: 2022-05-30
Payer: COMMERCIAL